# Patient Record
Sex: MALE | Race: OTHER | NOT HISPANIC OR LATINO
[De-identification: names, ages, dates, MRNs, and addresses within clinical notes are randomized per-mention and may not be internally consistent; named-entity substitution may affect disease eponyms.]

---

## 2021-01-22 PROBLEM — Z00.00 ENCOUNTER FOR PREVENTIVE HEALTH EXAMINATION: Status: ACTIVE | Noted: 2021-01-22

## 2021-01-26 ENCOUNTER — APPOINTMENT (OUTPATIENT)
Dept: SURGERY | Facility: CLINIC | Age: 71
End: 2021-01-26
Payer: MEDICARE

## 2021-01-26 VITALS
OXYGEN SATURATION: 97 % | BODY MASS INDEX: 28.19 KG/M2 | TEMPERATURE: 97.2 F | WEIGHT: 186 LBS | HEIGHT: 68 IN | HEART RATE: 71 BPM | SYSTOLIC BLOOD PRESSURE: 150 MMHG | DIASTOLIC BLOOD PRESSURE: 80 MMHG

## 2021-01-26 DIAGNOSIS — L72.3 SEBACEOUS CYST: ICD-10-CM

## 2021-01-26 DIAGNOSIS — Z86.79 PERSONAL HISTORY OF OTHER DISEASES OF THE CIRCULATORY SYSTEM: ICD-10-CM

## 2021-01-26 DIAGNOSIS — I10 ESSENTIAL (PRIMARY) HYPERTENSION: ICD-10-CM

## 2021-01-26 DIAGNOSIS — R22.30 LOCALIZED SWELLING, MASS AND LUMP, UNSPECIFIED UPPER LIMB: ICD-10-CM

## 2021-01-26 DIAGNOSIS — Z87.891 PERSONAL HISTORY OF NICOTINE DEPENDENCE: ICD-10-CM

## 2021-01-26 DIAGNOSIS — Z80.0 FAMILY HISTORY OF MALIGNANT NEOPLASM OF DIGESTIVE ORGANS: ICD-10-CM

## 2021-01-26 DIAGNOSIS — M77.8 OTHER ENTHESOPATHIES, NOT ELSEWHERE CLASSIFIED: ICD-10-CM

## 2021-01-26 DIAGNOSIS — M75.81 OTHER SHOULDER LESIONS, RIGHT SHOULDER: ICD-10-CM

## 2021-01-26 DIAGNOSIS — Z86.39 PERSONAL HISTORY OF OTHER ENDOCRINE, NUTRITIONAL AND METABOLIC DISEASE: ICD-10-CM

## 2021-01-26 PROCEDURE — 99214 OFFICE O/P EST MOD 30 MIN: CPT

## 2021-01-26 PROCEDURE — 99072 ADDL SUPL MATRL&STAF TM PHE: CPT

## 2021-01-26 RX ORDER — ROSUVASTATIN CALCIUM 20 MG/1
20 TABLET, FILM COATED ORAL
Refills: 0 | Status: ACTIVE | COMMUNITY

## 2021-01-26 RX ORDER — ASPIRIN 81 MG
81 TABLET,CHEWABLE ORAL
Refills: 0 | Status: ACTIVE | COMMUNITY

## 2021-01-26 RX ORDER — VALSARTAN 160 MG/1
160 TABLET, COATED ORAL
Refills: 0 | Status: ACTIVE | COMMUNITY

## 2021-01-26 NOTE — HISTORY OF PRESENT ILLNESS
[de-identified] : Patient presents to the office with a history of posterior neck mass.  He had surgery for a lipoma few years ago.  He feels there is some scar tissue.  He also has a small nodule in that region.  Patient also has symptoms of arthritis of the neck as well as the hand region.  Warm compresses do help his symptoms.

## 2021-01-26 NOTE — ASSESSMENT
[FreeTextEntry1] : After examination patient was explained that his symptoms are not from the sebaceous cyst.  The cyst is not infected and continued conservative management explained to the patient.  There is no evidence of lipoma over the vertebral spine and does not require any surgery.  For his neck symptoms patient was advised to see orthopedist.  He has generalized joint pains for which rheumatologist consultation was explained to the patient.  Follow-up in the office as needed.

## 2021-01-26 NOTE — PHYSICAL EXAM
[Alert] : alert [Oriented to Person] : oriented to person [Oriented to Place] : oriented to place [Oriented to Time] : oriented to time [Calm] : calm [de-identified] : Patient is in no acute distress.  Denies any drainage from the cyst. [de-identified] : In the posterior neck region more on the right side over the vertebral prominence patient has a scar which is approximately 3 cm in size.  Some lipoma or fatty tissue overlying the vertebral spine is palpated.  No evidence of any lipoma.  Also on the right side of the midline there is a 1.5 cm size sebaceous cyst which is not infected.  Patient has no tenderness in that region.  No active drainage. [de-identified] : Sebaceous cyst of the posterior neck region without any evidence of infection.

## 2021-01-26 NOTE — CONSULT LETTER
[Dear  ___] : Dear  [unfilled], [Consult Letter:] : I had the pleasure of evaluating your patient, [unfilled]. [Please see my note below.] : Please see my note below. [Consult Closing:] : Thank you very much for allowing me to participate in the care of this patient.  If you have any questions, please do not hesitate to contact me. [Sincerely,] : Sincerely, [FreeTextEntry3] : Merrill Asif MD, FACS\par [facility]\[place] [address]\par

## 2021-01-26 NOTE — REVIEW OF SYSTEMS
[Negative] : Constitutional [Fever] : no fever [Shortness Of Breath] : no shortness of breath [Abdominal Pain] : no abdominal pain [Vomiting] : no vomiting [Constipation] : no constipation [Diarrhea] : no diarrhea [Heartburn] : no heartburn [Melena] : no melena

## 2023-10-17 ENCOUNTER — APPOINTMENT (OUTPATIENT)
Dept: SURGERY | Facility: CLINIC | Age: 73
End: 2023-10-17